# Patient Record
Sex: MALE | Race: WHITE | NOT HISPANIC OR LATINO | URBAN - METROPOLITAN AREA
[De-identification: names, ages, dates, MRNs, and addresses within clinical notes are randomized per-mention and may not be internally consistent; named-entity substitution may affect disease eponyms.]

---

## 2017-09-17 ENCOUNTER — EMERGENCY (EMERGENCY)
Facility: HOSPITAL | Age: 27
LOS: 0 days | Discharge: HOME | End: 2017-09-17

## 2017-09-17 DIAGNOSIS — F11.120 OPIOID ABUSE WITH INTOXICATION, UNCOMPLICATED: ICD-10-CM

## 2017-09-17 DIAGNOSIS — F11.10 OPIOID ABUSE, UNCOMPLICATED: ICD-10-CM

## 2021-08-16 ENCOUNTER — EMERGENCY (EMERGENCY)
Facility: HOSPITAL | Age: 31
LOS: 1 days | End: 2021-08-16
Attending: EMERGENCY MEDICINE
Payer: COMMERCIAL

## 2021-08-16 VITALS
DIASTOLIC BLOOD PRESSURE: 73 MMHG | WEIGHT: 279.99 LBS | OXYGEN SATURATION: 99 % | TEMPERATURE: 98 F | HEART RATE: 76 BPM | RESPIRATION RATE: 16 BRPM | SYSTOLIC BLOOD PRESSURE: 119 MMHG | HEIGHT: 73 IN

## 2021-08-16 PROCEDURE — 99283 EMERGENCY DEPT VISIT LOW MDM: CPT

## 2021-08-16 RX ORDER — CYCLOBENZAPRINE HYDROCHLORIDE 10 MG/1
1 TABLET, FILM COATED ORAL
Qty: 15 | Refills: 0
Start: 2021-08-16 | End: 2021-08-20

## 2021-08-16 RX ORDER — IBUPROFEN 200 MG
600 TABLET ORAL ONCE
Refills: 0 | Status: COMPLETED | OUTPATIENT
Start: 2021-08-16 | End: 2021-08-16

## 2021-08-16 RX ADMIN — Medication 600 MILLIGRAM(S): at 06:09

## 2021-08-16 NOTE — ED ADULT TRIAGE NOTE - CHIEF COMPLAINT QUOTE
Patient complaining of bilateral arm numbness started a month ago stated his arms clicks whenever he extends but unable to extend his arm all the way

## 2021-08-16 NOTE — ED ADULT NURSE NOTE - OBJECTIVE STATEMENT
31 y/o M patient presents to ED from home c/o numbness and tingling in b/l arms for the past month. As per patient he exerts himself at work with same kind of movement with arms. Patient reports he has not been seen yet by pmd/specialist. As per patient he took teodoro aspirin with minimal relief. Patient denies HA, dizziness, SOB, chest pain, abdominal pain, N/V/D. Safety and comfort measures provided and maintained.

## 2021-08-16 NOTE — ED PROVIDER NOTE - CARE PROVIDER_API CALL
Rubio Ayala)  Orthopaedic Surgery Surgery  [address] [place] [address]  Phone: [phone]  Fax: [phone]  Follow Up Time:

## 2021-08-16 NOTE — ED PROVIDER NOTE - NSFOLLOWUPINSTRUCTIONS_ED_ALL_ED_FT
Musculoskeletal Pain    WHAT YOU NEED TO KNOW:    Musculoskeletal pain can occur in muscles, bones, ligaments, tendons, or nerves. The pain can be dull, achy, or sharp. You may have pain and tenderness to the touch as well. The pain can occur anywhere in your body. Musculoskeletal pain can be from an injury, or a medical condition such as polymyositis.    DISCHARGE INSTRUCTIONS:    Return to the emergency department if:   •You have severe pain when you move the area.      •You lose feeling in the area.      •You have new or worse pain or swelling in the area. Your skin may feel tight.      Call your doctor or pain specialist if:   •You have a fever.      •You have pain that does not get better with treatment.      •You have trouble sleeping because of your pain.      •Your painful area becomes more tender, red, and warm to the touch.      •You have less movement of the painful area.      •You have questions or concerns about your condition or care.      Self-care:   •Rest as directed. Avoid activity that causes pain. You may be able to return to normal activity when you can move without pain. Follow directions for rest and activity. You are at risk for injury for 3 weeks after your symptoms go away.      •Ice the painful area to decrease pain and swelling. Use an ice pack, or put ice in a plastic bag and cover it with a towel. Always put a cloth between the ice and your skin. Apply the ice as often as directed for the first 24 to 48 hours.      •Apply compression to the area, if directed. Your healthcare provider may want you to use a splint, brace, or elastic bandage. Compression helps decrease pain and swelling in an arm or leg. A splint, brace, or bandage will also help protect the painful area when you move around.  How to Wrap an Elastic Bandage           •Elevate a painful arm or leg to reduce swelling and pain. Elevate your limb while you are sitting or lying. Prop a painful leg on pillows to keep it above the level of your heart.         Elevate Leg           Medicines: You may need any of the following:  •NSAIDs help decrease swelling and pain or fever. This medicine is available with or without a doctor's order. NSAIDs can cause stomach bleeding or kidney problems in certain people. If you take blood thinner medicine, always ask your healthcare provider if NSAIDs are safe for you. Always read the medicine label and follow directions.      •Acetaminophen decreases pain and fever. It is available without a doctor's order. Ask how much to take and how often to take it. Follow directions. Read the labels of all other medicines you are using to see if they also contain acetaminophen, or ask your doctor or pharmacist. Acetaminophen can cause liver damage if not taken correctly. Do not use more than 4 grams (4,000 milligrams) total of acetaminophen in one day.       •Muscle relaxers help relax your muscles to decrease pain and muscle spasms.      •Steroids may be given to decrease redness, pain, and swelling.      •Take your medicine as directed. Contact your healthcare provider if you think your medicine is not helping or if you have side effects. Tell him or her if you are allergic to any medicine. Keep a list of the medicines, vitamins, and herbs you take. Include the amounts, and when and why you take them. Bring the list or the pill bottles to follow-up visits. Carry your medicine list with you in case of an emergency.      Follow up with your doctor or pain specialist as directed: You may need more tests to help healthcare providers find the cause of your muscle pain. You may need physical therapy to learn muscle strengthening exercises. Write down your questions so you remember to ask them during your visits.       © Copyright [employer] 2021

## 2021-08-16 NOTE — ED PROVIDER NOTE - PATIENT PORTAL LINK FT
You can access the FollowMyHealth Patient Portal offered by [facility] by registering at the following website: [email or web address]. By joining [employer]’s FollowMyHealth portal, you will also be able to view your health information using other applications (apps) compatible with our system.

## 2021-08-19 PROBLEM — Z00.00 ENCOUNTER FOR PREVENTIVE HEALTH EXAMINATION: Status: ACTIVE | Noted: 2021-08-19

## 2021-08-20 DIAGNOSIS — M25.50 PAIN IN UNSPECIFIED JOINT: ICD-10-CM

## 2021-08-20 DIAGNOSIS — B35.3 TINEA PEDIS: ICD-10-CM

## 2022-01-19 ENCOUNTER — EMERGENCY (EMERGENCY)
Facility: HOSPITAL | Age: 32
LOS: 0 days | Discharge: HOME | End: 2022-01-20
Attending: STUDENT IN AN ORGANIZED HEALTH CARE EDUCATION/TRAINING PROGRAM
Payer: COMMERCIAL

## 2022-01-19 VITALS
TEMPERATURE: 100 F | RESPIRATION RATE: 17 BRPM | OXYGEN SATURATION: 100 % | WEIGHT: 250 LBS | HEART RATE: 120 BPM | HEIGHT: 73 IN | SYSTOLIC BLOOD PRESSURE: 140 MMHG | DIASTOLIC BLOOD PRESSURE: 63 MMHG

## 2022-01-19 DIAGNOSIS — Z04.1 ENCOUNTER FOR EXAMINATION AND OBSERVATION FOLLOWING TRANSPORT ACCIDENT: ICD-10-CM
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
## 2022-01-19 LAB — GLUCOSE BLDC GLUCOMTR-MCNC: 101 MG/DL — HIGH (ref 70–99)

## 2022-01-19 PROCEDURE — 99284 EMERGENCY DEPT VISIT MOD MDM: CPT

## 2022-01-19 PROCEDURE — 99053 MED SERV 10PM-8AM 24 HR FAC: CPT
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
## 2022-01-19 NOTE — ED ADULT NURSE REASSESSMENT NOTE - NS ED NURSE REASSESS COMMENT FT1
Pt is alert and oriented x4, refusing blood work and EKG. Dr. Vásquez made aware. Pt pending CT scan.
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
## 2022-01-20 LAB
AMPHET UR-MCNC: NEGATIVE — SIGNIFICANT CHANGE UP
BARBITURATES UR SCN-MCNC: NEGATIVE — SIGNIFICANT CHANGE UP
BENZODIAZ UR-MCNC: NEGATIVE — SIGNIFICANT CHANGE UP
COCAINE METAB.OTHER UR-MCNC: NEGATIVE — SIGNIFICANT CHANGE UP
METHADONE UR-MCNC: NEGATIVE — SIGNIFICANT CHANGE UP
OPIATES UR-MCNC: POSITIVE — SIGNIFICANT CHANGE UP
PCP SPEC-MCNC: SIGNIFICANT CHANGE UP
PCP UR-MCNC: NEGATIVE — SIGNIFICANT CHANGE UP
THC UR QL: NEGATIVE — SIGNIFICANT CHANGE UP

## 2022-01-20 PROCEDURE — 72125 CT NECK SPINE W/O DYE: CPT | Mod: 26,MA

## 2022-01-20 PROCEDURE — 70450 CT HEAD/BRAIN W/O DYE: CPT | Mod: 26,MA
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
## 2022-01-20 NOTE — ED PROVIDER NOTE - NSFOLLOWUPINSTRUCTIONS_ED_ALL_ED_FT
Rest, drink plenty of fluids.  Advance activity as tolerated.  Continue all previously prescribed medications as directed.  Follow up with your PMD 2-3 days and bring copies of your results.  Return to the ER for worsening symptoms,    Take acetaminophen 650 mg orally every 6-8 hours for pain control as needed. Please do not exceed 4,000 mg of acetaminophen during a 24 hours period. Acetaminophen can be found in many over-the-counter cold medications as well as opioid medications that may be given for pain.    Take ibuprofen (also known as MOTRIN or ADVIL) 400 mg orally every 6-8 hours for pain control as needed with food to avoid an upset stomach. Ibuprofen can be found in many over-the-counter medications. Please do not take ibuprofen if you have a bleeding disorder, stomach or gastrointestinal ulcer, or liver disease.    If needed, you can alternate these medications so that you can take one medication every 3 hours. For example, at noon take ibuprofen, then at 3PM take acetaminophen, then at 6PM take ibuprofen.
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
## 2022-01-20 NOTE — ED PROVIDER NOTE - PATIENT PORTAL LINK FT
You can access the FollowMyHealth Patient Portal offered by [facility] by registering at the following website: [email or web address]. By joining [employer]’s FollowMyHealth portal, you will also be able to view your health information using other applications (apps) compatible with our system.
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
## 2022-01-20 NOTE — ED PROVIDER NOTE - PHYSICAL EXAMINATION
VITAL SIGNS: I have reviewed nursing notes and confirm.   GEN: Well-developed; well-nourished; in no acute distress. Speaking full sentences. GCS 15  SKIN: Warm, pink, no rash, no diaphoresis, no cyanosis, well perfused.   HEAD: Normocephalic; atraumatic. No scalp lacerations, no abrasions.  NECK: Supple; non tender.  NO midline ttp,  NO step offs,  Full ROM w/o pain.  EYES: Pupils 3mm equal, round, reactive to light and accomodation, conjunctiva and sclera clear. Extra-ocular movements intact bilaterally.  ENT: No nasal discharge; airway clear. Trachea is midline. ORAL: No oropharyngeal exudates or erythema. Normal dentition.  CV: Regular rate and rhythm. S1, S2 normal; no murmurs, gallops, or rubs. No lower extremity pitting edema bilaterally. Capillary refill < 2 seconds throughout. Distal pulses intact 2+ throughout. XXX NO chest wall TTP.  RESP: CTA bilaterally. No wheezes, rales, or rhonchi.   ABD: Normal bowel sounds, soft, non-distended, non-tender, no rebound, no guarding, no rigidity, no hepatosplenomegaly. No CVA tenderness bilaterally.   MSK: Normal range of motion and movement of all 4 extremities. No joint or muscular pain throughout. No clubbing.    BACK:  NO thoracolumbar midline TTP,  NO parathoracic TTP,  NO paralumbar TTP. No step-offs or obvious deformities.   NEURO: Alert & oriented x 3, Grossly unremarkable. Sensory and motor intact throughout. No focal deficits. Gait: Fluid. Normal speech and coordination.   PSYCH: Cooperative, appropriate.

## 2022-06-07 NOTE — ED ADULT TRIAGE NOTE - ARRIVAL FROM
How Severe Is Your Skin Lesion?: mild Is This A New Presentation, Or A Follow-Up?: Skin Lesions Street

## 2023-03-21 NOTE — ED PROVIDER NOTE - OBJECTIVE STATEMENT
No 31yo male who presents with tingling in both arms with forearm pain for a month. 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

## 2023-08-08 NOTE — ED ADULT NURSE NOTE - NS ED NURSE RECORD ANOTHER VITAL SIGN
Impression: Vitreous hemorrhage, right eye: H43.11. OD. Plan: VH has cleared for the most part. Patient no longer bothered by floaters.   Old VH seen inferiorly in vitreous cavity Yes fair minus

## 2023-10-30 NOTE — ED ADULT NURSE NOTE - MODE OF DISCHARGE
Render Note In Bullet Format When Appropriate: No Consent: The patient's consent was obtained including but not limited to risks of crusting, scabbing, blistering, scarring, darker or lighter pigmentary change, recurrence, incomplete removal and infection. Number Of Freeze-Thaw Cycles: 2 freeze-thaw cycles Duration Of Freeze Thaw-Cycle (Seconds): 0 Post-Care Instructions: I reviewed with the patient in detail post-care instructions. Patient is to wear sunprotection, and avoid picking at any of the treated lesions. Pt may apply Vaseline to crusted or scabbing areas. Show Applicator Variable?: Yes Detail Level: Detailed Ambulatory

## 2025-03-05 NOTE — ED ADULT NURSE NOTE - SUICIDE SCREENING DEPRESSION
Discussed with daughter  Will continue oxycodone IR at 5 mg Q 6 hrs PRN  Continue prednisone 20 mg daily (synergistic for bone pain)  Continue lidocaine patch  Potentially candidate for long acting opioids (? low dose Fentanyl patch), but daughter declining at this time     Bowel regimen in place with Miralax daily and Senna QHS  Continue oral Dulcolax 10 mg QHS PRN. UA mildly positive iso increased lethargy  denies dysuria  s/p 1g CTX  f/u Ucx  c/w abx Discussed with daughter  Will continue oxycodone IR, now at 5 mg Q 6 hrs ATC  Continue prednisone 20 mg daily (synergistic for bone pain)  Continue lidocaine patch       Bowel regimen in place with Miralax daily and Senna QHS  Continue oral Dulcolax 10 mg QHS PRN.    If additional long acting pain control is needed, next step would be Fentanyl patch at 12 mcg Q 72 hours UA mildly positive iso increased lethargy  denies dysuria  s/p CTX 3 day course Negative